# Patient Record
Sex: MALE | Race: WHITE | URBAN - METROPOLITAN AREA
[De-identification: names, ages, dates, MRNs, and addresses within clinical notes are randomized per-mention and may not be internally consistent; named-entity substitution may affect disease eponyms.]

---

## 2020-08-20 ENCOUNTER — OFFICE VISIT (OUTPATIENT)
Dept: PRIMARY CARE CLINIC | Age: 25
End: 2020-08-20
Payer: OTHER GOVERNMENT

## 2020-08-20 PROCEDURE — 99211 OFF/OP EST MAY X REQ PHY/QHP: CPT | Performed by: NURSE PRACTITIONER

## 2020-08-20 NOTE — PATIENT INSTRUCTIONS

## 2020-08-24 LAB — SARS-COV-2, NAA: NOT DETECTED

## 2023-03-10 ENCOUNTER — OFFICE VISIT (OUTPATIENT)
Dept: FAMILY MEDICINE CLINIC | Age: 28
End: 2023-03-10

## 2023-03-10 VITALS
OXYGEN SATURATION: 98 % | RESPIRATION RATE: 16 BRPM | SYSTOLIC BLOOD PRESSURE: 116 MMHG | TEMPERATURE: 98 F | BODY MASS INDEX: 28.69 KG/M2 | DIASTOLIC BLOOD PRESSURE: 74 MMHG | WEIGHT: 178.5 LBS | HEIGHT: 66 IN | HEART RATE: 74 BPM

## 2023-03-10 DIAGNOSIS — Z11.3 SCREENING FOR STD (SEXUALLY TRANSMITTED DISEASE): ICD-10-CM

## 2023-03-10 DIAGNOSIS — Z11.3 SCREENING FOR STD (SEXUALLY TRANSMITTED DISEASE): Primary | ICD-10-CM

## 2023-03-10 DIAGNOSIS — L73.9 FOLLICULITIS: ICD-10-CM

## 2023-03-10 PROCEDURE — 36415 COLL VENOUS BLD VENIPUNCTURE: CPT | Performed by: NURSE PRACTITIONER

## 2023-03-10 RX ORDER — DOXYCYCLINE HYCLATE 100 MG
100 TABLET ORAL 2 TIMES DAILY
Qty: 14 TABLET | Refills: 0 | Status: SHIPPED | OUTPATIENT
Start: 2023-03-10 | End: 2023-03-17

## 2023-03-10 SDOH — ECONOMIC STABILITY: FOOD INSECURITY: WITHIN THE PAST 12 MONTHS, YOU WORRIED THAT YOUR FOOD WOULD RUN OUT BEFORE YOU GOT MONEY TO BUY MORE.: NEVER TRUE

## 2023-03-10 SDOH — ECONOMIC STABILITY: HOUSING INSECURITY
IN THE LAST 12 MONTHS, WAS THERE A TIME WHEN YOU DID NOT HAVE A STEADY PLACE TO SLEEP OR SLEPT IN A SHELTER (INCLUDING NOW)?: NO

## 2023-03-10 SDOH — ECONOMIC STABILITY: FOOD INSECURITY: WITHIN THE PAST 12 MONTHS, THE FOOD YOU BOUGHT JUST DIDN'T LAST AND YOU DIDN'T HAVE MONEY TO GET MORE.: NEVER TRUE

## 2023-03-10 SDOH — ECONOMIC STABILITY: INCOME INSECURITY: HOW HARD IS IT FOR YOU TO PAY FOR THE VERY BASICS LIKE FOOD, HOUSING, MEDICAL CARE, AND HEATING?: NOT HARD AT ALL

## 2023-03-10 ASSESSMENT — PATIENT HEALTH QUESTIONNAIRE - PHQ9
SUM OF ALL RESPONSES TO PHQ QUESTIONS 1-9: 0
SUM OF ALL RESPONSES TO PHQ9 QUESTIONS 1 & 2: 0
SUM OF ALL RESPONSES TO PHQ QUESTIONS 1-9: 0
1. LITTLE INTEREST OR PLEASURE IN DOING THINGS: 0
2. FEELING DOWN, DEPRESSED OR HOPELESS: 0

## 2023-03-10 NOTE — ASSESSMENT & PLAN NOTE
Does not appear to be herpetic lesion. Treat with doxy for folliculitis.  Will send all STI screening

## 2023-03-10 NOTE — PROGRESS NOTES
Nicholas Cummings (:  1995) is a 27 y.o. male,New patient, here for evaluation of the following chief complaint(s):  New Patient      ASSESSMENT/PLAN:  1. Screening for STD (sexually transmitted disease)  -     HIV Screen  -     C.trachomatis N.gonorrhoeae DNA, Urine; Future  -     Syphilis Antibody Cascading Reflex; Future  -     Hepatitis C Antibody; Future  -     Herpes simplex virus (HSV) I/II antibodies IgG & IgM w/ reflex  2. Folliculitis  Assessment & Plan:   Does not appear to be herpetic lesion. Treat with doxy for folliculitis. Will send all STI screening  Orders:  -     doxycycline hyclate (VIBRA-TABS) 100 MG tablet; Take 1 tablet by mouth 2 times daily for 7 days, Disp-14 tablet, R-0Normal    Patient Instructions   Start oral antibiotic   Labs sent today     Return if symptoms worsen or fail to improve.    SUBJECTIVE/OBJECTIVE:  Sexually Transmitted Diseases  The patient's primary symptoms include genital lesions. The patient's pertinent negatives include no genital injury, genital itching, pelvic pain, penile discharge, scrotal swelling or testicular pain. This is a new problem. The current episode started in the past 7 days. The problem has been unchanged. Pertinent negatives include no fever, flank pain, hematuria or hesitancy. The genital lesion's characteristics include Ulcer. The symptoms are aggravated by urination. He has tried nothing for the symptoms. He is sexually active. It is unknown whether or not his partner has an STD.     Current Outpatient Medications   Medication Sig Dispense Refill    doxycycline hyclate (VIBRA-TABS) 100 MG tablet Take 1 tablet by mouth 2 times daily for 7 days 14 tablet 0    naproxen (NAPROSYN) 500 MG tablet Take 1 tablet by mouth 2 times daily as needed for Pain (Patient not taking: Reported on 3/10/2023) 30 tablet 0     No current facility-administered medications for this visit.       No past medical history on file.  Past Surgical History:   Procedure  Laterality Date    TONSILLECTOMY       Social History     Socioeconomic History    Marital status: Single     Spouse name: Not on file    Number of children: Not on file    Years of education: Not on file    Highest education level: Not on file   Occupational History    Not on file   Tobacco Use    Smoking status: Never    Smokeless tobacco: Never   Substance and Sexual Activity    Alcohol use: Yes     Comment: occ    Drug use: Yes     Types: Marijuana Tnea Azam)    Sexual activity: Not on file   Other Topics Concern    Not on file   Social History Narrative    Not on file     Social Determinants of Health     Financial Resource Strain: Low Risk     Difficulty of Paying Living Expenses: Not hard at all   Food Insecurity: No Food Insecurity    Worried About [address] in the Last Year: Never true    [address] in the Last Year: Never true   Transportation Needs: Unknown    Lack of Transportation (Medical): Not on file    Lack of Transportation (Non-Medical): No   Physical Activity: Not on file   Stress: Not on file   Social Connections: Not on file   Intimate Partner Violence: Not on file   Housing Stability: Unknown    Unable to Pay for Housing in the Last Year: Not on file    Number of Places Lived in the Last Year: Not on file    Unstable Housing in the Last Year: No         Review of Systems   Constitutional:  Negative for fever. Genitourinary:  Negative for flank pain, hesitancy, pelvic pain, penile discharge, scrotal swelling and testicular pain. Vitals:    03/10/23 1224   BP: 116/74   Pulse: 74   Resp: 16   Temp: 98 °F (36.7 °C)   TempSrc: Tympanic   SpO2: 98%   Weight: 178 lb 8 oz (81 kg)   Height: 5' 6\" (1.676 m)      No results found for this or any previous visit (from the past 2016 hour(s)). Wt Readings from Last 3 Encounters:   03/10/23 178 lb 8 oz (81 kg)   05/29/17 166 lb 14.2 oz (75.7 kg) (69 %, Z= 0.48)*     * Growth percentiles are based on CDC (Boys, 2-20 Years) data. Physical Exam              An electronic signature was used to authenticate this note.     --Joe Art, APRKEVIN - CNP

## 2023-03-11 LAB
C. TRACHOMATIS DNA ,URINE: NEGATIVE
HEPATITIS C ANTIBODY INTERPRETATION: NORMAL
HIV AG/AB: NORMAL
HIV ANTIGEN: NORMAL
HIV-1 ANTIBODY: NORMAL
HIV-2 AB: NORMAL
N. GONORRHOEAE DNA, URINE: NEGATIVE
TOTAL SYPHILLIS IGG/IGM: NORMAL

## 2023-04-09 PROBLEM — Z11.3 SCREENING FOR STD (SEXUALLY TRANSMITTED DISEASE): Status: RESOLVED | Noted: 2023-03-10 | Resolved: 2023-04-09
